# Patient Record
(demographics unavailable — no encounter records)

---

## 2024-11-18 NOTE — HISTORY OF PRESENT ILLNESS
[FreeTextEntry1] : 80 y.o. M with hx of BPH s/p green light who presents as a f/u States that he has felt his urinary sxs have gotten worse in the past few months, does not feel like he can hold his urine very well  Reports that he has a small amount of leakage if he can't make it to the bathroom in time, also with post-void dribbling  Daytime frequency every 2 hours, nocturia x2-3/night  States his stream is slow to start, stream seems weak  Sometimes feels that he has to strain to void  Had hematuria 5 weeks ago, self-resolved after a few days  Does not wear pads or diapers  No leakage with coughing, sneezing, laughing  No dysuria, no pain   8/2023 MRI prostate: 107cc prostate, PSA density 0.01

## 2024-11-18 NOTE — ASSESSMENT
[FreeTextEntry1] : 80 M with PMHx of BPH s/p green light (several years ago) with worsening LUTS, including post-void dribbling, urge incontinence, weak stream. Also with episode of gross hematuria 5 weeks ago.   #BPH w/ LUTS  -  mL - Pt already on maximal medical therapy (finasteride, tadalafil, silodosin). Discussed poor candidate for anticholinergics given enlarged prostate and age. DID discuss surgery as next step - he reports he does NOT want surgery  - Discussed w/ patient lifestyle modifications, including timed voiding and expressing remaining urine from urethra to avoid post-void dribbling  #Gross hematuria  - Schedule for cystoscopy - Repeat CT urogram

## 2024-12-20 NOTE — HEALTH RISK ASSESSMENT
[Good] : ~his/her~  mood as  good [No] : In the past 12 months have you used drugs other than those required for medical reasons? No [No falls in past year] : Patient reported no falls in the past year [0] : 2) Feeling down, depressed, or hopeless: Not at all (0) [PHQ-2 Negative - No further assessment needed] : PHQ-2 Negative - No further assessment needed [Never] : Never [Fully functional (bathing, dressing, toileting, transferring, walking, feeding)] : Fully functional (bathing, dressing, toileting, transferring, walking, feeding) [Fully functional (using the telephone, shopping, preparing meals, housekeeping, doing laundry, using] : Fully functional and needs no help or supervision to perform IADLs (using the telephone, shopping, preparing meals, housekeeping, doing laundry, using transportation, managing medications and managing finances) [[id number]] : 0 [Change in mental status noted] : No change in mental status noted [Reports changes in hearing] : Reports no changes in hearing [Reports changes in vision] : Reports no changes in vision
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
## 2024-12-20 NOTE — HEALTH RISK ASSESSMENT
[Good] : ~his/her~  mood as  good [No] : In the past 12 months have you used drugs other than those required for medical reasons? No [No falls in past year] : Patient reported no falls in the past year [0] : 2) Feeling down, depressed, or hopeless: Not at all (0) [PHQ-2 Negative - No further assessment needed] : PHQ-2 Negative - No further assessment needed [Never] : Never [Fully functional (bathing, dressing, toileting, transferring, walking, feeding)] : Fully functional (bathing, dressing, toileting, transferring, walking, feeding) [Fully functional (using the telephone, shopping, preparing meals, housekeeping, doing laundry, using] : Fully functional and needs no help or supervision to perform IADLs (using the telephone, shopping, preparing meals, housekeeping, doing laundry, using transportation, managing medications and managing finances) [[id number]] : 0 [Change in mental status noted] : No change in mental status noted [Reports changes in hearing] : Reports no changes in hearing [Reports changes in vision] : Reports no changes in vision
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
## 2024-12-20 NOTE — HEALTH RISK ASSESSMENT
[Good] : ~his/her~  mood as  good [No] : In the past 12 months have you used drugs other than those required for medical reasons? No [No falls in past year] : Patient reported no falls in the past year [0] : 2) Feeling down, depressed, or hopeless: Not at all (0) [PHQ-2 Negative - No further assessment needed] : PHQ-2 Negative - No further assessment needed [Never] : Never [Fully functional (bathing, dressing, toileting, transferring, walking, feeding)] : Fully functional (bathing, dressing, toileting, transferring, walking, feeding) [Fully functional (using the telephone, shopping, preparing meals, housekeeping, doing laundry, using] : Fully functional and needs no help or supervision to perform IADLs (using the telephone, shopping, preparing meals, housekeeping, doing laundry, using transportation, managing medications and managing finances) [[id number]] : 0 [Change in mental status noted] : No change in mental status noted [Reports changes in hearing] : Reports no changes in hearing [Reports changes in vision] : Reports no changes in vision
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
## 2025-05-06 NOTE — REVIEW OF SYSTEMS
[Joint Pain] : joint pain [Muscle Pain] : muscle pain [Back Pain] : back pain [Negative] : Psychiatric Improved
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
